# Patient Record
Sex: MALE | Race: WHITE | NOT HISPANIC OR LATINO | Employment: FULL TIME | URBAN - METROPOLITAN AREA
[De-identification: names, ages, dates, MRNs, and addresses within clinical notes are randomized per-mention and may not be internally consistent; named-entity substitution may affect disease eponyms.]

---

## 2017-08-15 ENCOUNTER — OFFICE VISIT - RIVER FALLS (OUTPATIENT)
Dept: FAMILY MEDICINE | Facility: CLINIC | Age: 22
End: 2017-08-15

## 2020-01-06 ENCOUNTER — COMMUNICATION - RIVER FALLS (OUTPATIENT)
Dept: FAMILY MEDICINE | Facility: CLINIC | Age: 25
End: 2020-01-06
Payer: COMMERCIAL

## 2020-01-10 ENCOUNTER — OFFICE VISIT (OUTPATIENT)
Dept: FAMILY MEDICINE | Facility: CLINIC | Age: 25
End: 2020-01-10
Payer: COMMERCIAL

## 2020-01-10 VITALS
BODY MASS INDEX: 22.86 KG/M2 | OXYGEN SATURATION: 97 % | TEMPERATURE: 98.6 F | HEART RATE: 77 BPM | DIASTOLIC BLOOD PRESSURE: 80 MMHG | WEIGHT: 168.8 LBS | SYSTOLIC BLOOD PRESSURE: 150 MMHG | RESPIRATION RATE: 16 BRPM | HEIGHT: 72 IN

## 2020-01-10 DIAGNOSIS — R03.0 ELEVATED BLOOD PRESSURE READING WITHOUT DIAGNOSIS OF HYPERTENSION: ICD-10-CM

## 2020-01-10 DIAGNOSIS — B35.1 ONYCHOMYCOSIS: Primary | ICD-10-CM

## 2020-01-10 DIAGNOSIS — Z13.6 CARDIOVASCULAR SCREENING; LDL GOAL LESS THAN 130: ICD-10-CM

## 2020-01-10 LAB
ALBUMIN SERPL-MCNC: 4 G/DL (ref 3.4–5)
ALP SERPL-CCNC: 123 U/L (ref 40–150)
ALT SERPL W P-5'-P-CCNC: 20 U/L (ref 0–70)
ANION GAP SERPL CALCULATED.3IONS-SCNC: 4 MMOL/L (ref 3–14)
AST SERPL W P-5'-P-CCNC: 21 U/L (ref 0–45)
BILIRUB DIRECT SERPL-MCNC: 0.1 MG/DL (ref 0–0.2)
BILIRUB SERPL-MCNC: 0.4 MG/DL (ref 0.2–1.3)
BUN SERPL-MCNC: 12 MG/DL (ref 7–30)
CALCIUM SERPL-MCNC: 9.1 MG/DL (ref 8.5–10.1)
CHLORIDE SERPL-SCNC: 104 MMOL/L (ref 94–109)
CHOLEST SERPL-MCNC: 132 MG/DL
CO2 SERPL-SCNC: 29 MMOL/L (ref 20–32)
CREAT SERPL-MCNC: 0.96 MG/DL (ref 0.66–1.25)
GFR SERPL CREATININE-BSD FRML MDRD: >90 ML/MIN/{1.73_M2}
GLUCOSE SERPL-MCNC: 99 MG/DL (ref 70–99)
HDLC SERPL-MCNC: 57 MG/DL
LDLC SERPL CALC-MCNC: 65 MG/DL
NONHDLC SERPL-MCNC: 75 MG/DL
POTASSIUM SERPL-SCNC: 4 MMOL/L (ref 3.4–5.3)
PROT SERPL-MCNC: 7.9 G/DL (ref 6.8–8.8)
SODIUM SERPL-SCNC: 137 MMOL/L (ref 133–144)
TRIGL SERPL-MCNC: 52 MG/DL
TSH SERPL DL<=0.005 MIU/L-ACNC: 1.55 MU/L (ref 0.4–4)

## 2020-01-10 PROCEDURE — 84443 ASSAY THYROID STIM HORMONE: CPT | Performed by: NURSE PRACTITIONER

## 2020-01-10 PROCEDURE — 80061 LIPID PANEL: CPT | Performed by: NURSE PRACTITIONER

## 2020-01-10 PROCEDURE — 80048 BASIC METABOLIC PNL TOTAL CA: CPT | Performed by: NURSE PRACTITIONER

## 2020-01-10 PROCEDURE — 99204 OFFICE O/P NEW MOD 45 MIN: CPT | Performed by: NURSE PRACTITIONER

## 2020-01-10 PROCEDURE — 80076 HEPATIC FUNCTION PANEL: CPT | Performed by: NURSE PRACTITIONER

## 2020-01-10 PROCEDURE — 36415 COLL VENOUS BLD VENIPUNCTURE: CPT | Performed by: NURSE PRACTITIONER

## 2020-01-10 RX ORDER — ITRACONAZOLE 100 MG/1
CAPSULE ORAL
Qty: 84 CAPSULE | Refills: 0 | Status: SHIPPED | OUTPATIENT
Start: 2020-01-10

## 2020-01-10 ASSESSMENT — MIFFLIN-ST. JEOR: SCORE: 1788.18

## 2020-01-10 NOTE — PROGRESS NOTES
Subjective     Karel Webber is a 24 year old male who presents to clinic today for the following health issues:    HPI   Chief Complaint   Patient presents with     Establish Care     Nail problem     Patient has some discoloration of toenails on his left foot. Denies any other sx     Hypertension Follow-up      Do you check your blood pressure regularly outside of the clinic? No     Are you following a low salt diet? No    Are your blood pressures ever more than 140 on the top number (systolic) OR more   than 90 on the bottom number (diastolic), for example 140/90? Yes      How many servings of fruits and vegetables do you eat daily?  0-1    On average, how many sweetened beverages do you drink each day (Examples: soda, juice, sweet tea, etc.  Do NOT count diet or artificially sweetened beverages)?   2  How many days per week do you miss taking your medication? n/a    What makes it hard for you to take your medications?  n/a         Patient Active Problem List   Diagnosis     Onychomycosis     Elevated blood pressure reading without diagnosis of hypertension     History reviewed. No pertinent surgical history.    Social History     Tobacco Use     Smoking status: Never Smoker     Smokeless tobacco: Never Used   Substance Use Topics     Alcohol use: Yes     Alcohol/week: 0.0 standard drinks     Family History   Problem Relation Age of Onset     Diabetes Maternal Grandmother      Diabetes Maternal Aunt            PROBLEMS TO ADD ON...  Rash      Duration: month    Description  Location: back  Itching: no    Intensity:  mild    Accompanying signs and symptoms: None    History (similar episodes/previous evaluation): None    Precipitating or alleviating factors:  New exposures:  None  Recent travel: no      Therapies tried and outcome: none    Reviewed and updated as needed this visit by Provider  Tobacco  Allergies  Meds  Problems  Med Hx  Surg Hx  Fam Hx         Review of Systems   ROS COMP: Constitutional,  "HEENT, cardiovascular, pulmonary, gi and gu systems are negative, except as otherwise noted.      Objective    BP (!) 150/80   Pulse 77   Temp 98.6  F (37  C) (Tympanic)   Resp 16   Ht 1.82 m (5' 11.65\")   Wt 76.6 kg (168 lb 12.8 oz)   SpO2 97%   BMI 23.12 kg/m    Body mass index is 23.12 kg/m .  Physical Exam   GENERAL: healthy, alert and no distress  EYES: Eyes grossly normal to inspection, PERRL and conjunctivae and sclerae normal  HENT: normal cephalic/atraumatic, nose and mouth without ulcers or lesions, oropharynx clear and oral mucous membranes moist  NECK: no adenopathy, no asymmetry, masses, or scars and thyroid normal to palpation  RESP: lungs clear to auscultation - no rales, rhonchi or wheezes  CV: regular rate and rhythm, normal S1 S2, no S3 or S4, no murmur, click or rub, no peripheral edema and peripheral pulses strong  ABDOMEN: soft, nontender, no hepatosplenomegaly, no masses and bowel sounds normal  MS: no gross musculoskeletal defects noted, no edema  SKIN: tan annular patches scattered to back, white/yellowing of nails to left toe nails, no thickening  NEURO: Normal strength and tone, mentation intact and speech normal  PSYCH: mentation appears normal, affect normal/bright    Diagnostic Test Results:  Labs reviewed in Epic  No results found for this or any previous visit (from the past 24 hour(s)).        Assessment & Plan       ICD-10-CM    1. Onychomycosis B35.1 itraconazole (SPORANOX) 100 MG capsule     Hepatic panel (Albumin, ALT, AST, Bili, Alk Phos, TP)   2. Elevated blood pressure reading without diagnosis of hypertension R03.0 Basic metabolic panel  (Ca, Cl, CO2, Creat, Gluc, K, Na, BUN)     TSH with free T4 reflex     Lipid panel reflex to direct LDL Non-fasting   3. CARDIOVASCULAR SCREENING; LDL GOAL LESS THAN 130 Z13.6 Lipid panel reflex to direct LDL Non-fasting          FUTURE APPOINTMENTS:       - Make appointment with nurse to check blood pressure in 8 weeks       - " Follow-up for annual visit or as needed    Work on weight loss  Regular exercise    Patient Instructions     Patient Education     Your High Blood Pressure Risk Factors  Risk factors are things that make you more likely to have a disease or condition. Do you know your risk factors for high blood pressure? You can t do anything about some risk factors. But other risk factors are things that can be changed. Know what high blood pressure risk factors you have. Then find out what changes you can make to help control your risk for high blood pressure. Start with the change that you think will be easiest for you.  Risk factors you can t control  Though you can t change any of the things listed below, check off the ones that apply to you. The more boxes you check, the greater your risk for high blood pressure.  Family history  ? One or both of your parents or grandparents has had high blood pressure or heart disease.  Gender and age  ? You re a man over age 55 or a postmenopausal woman.  Risk factors you can control  There are plenty of risk factors for high blood pressure that you can control. Learn what these risk factors are and then find out how to reduce your risk. Check the ones that apply to you.  ? What you eat  Do you eat a lot of salty, fatty, fried, or greasy foods? Do you go to restaurants or eat out frequently?  ? Alcohol consumption  Do you drink more than 1 drink a day if you are a female or more than 2 drinks a day if you male?   ? If you smoke or someone close to you smokes  Do you smoke cigarettes or cigars, chew tobacco, or dip snuff? Are you exposed to second-hand smoke on a regular basis?  ? How active you are   Are you inactive most of the time at work and at home? Do you go weeks without exercising?  ? Your weight   Has your doctor said that you are 15 or more pounds overweight?  ? Your stress level    Do you often feel anxious, nervous, and stressed? Do you feel that you don't have a supportive  environment?  Date Last Reviewed: 4/27/2016 2000-2019 [employer]. [address] [facility], [place], [place] [address]. All rights reserved. This information is not intended as a substitute for professional medical care. Always follow your healthcare professional's instructions.           Patient Education     High Blood Pressure, To Be Confirmed, No Treatment  Your blood pressure today was higher than normal. Sometimes anxiety or pain can cause a temporary rise in blood pressure. It later returns to normal. Blood pressure that is high only one time doesn t mean that you have high blood pressure (hypertension). High blood pressure is a chronic illness. But you should have your blood pressure measured again within the next few days to find out if it s still high.    Blood pressure measurements are given as 2 numbers. Systolic blood pressure is the upper number. This is the pressure when the heart contracts. Diastolic blood pressure is the lower number. This is the pressure when the heart relaxes between beats. You will see your blood pressure readings written together. For example, a person with a systolic pressure of 118 and a diastolic pressure of 78 will have 118/78 written in the medical record.  Blood pressure is categorized as normal, elevated, or stage 1 or stage 2 high blood pressure:    Normal blood pressure is systolic of less than 120 and diastolic of less than 80 (120/80)    Elevated blood pressure is systolic of 120 to 129 and diastolic less than 80    Stage 1 high blood pressure is systolic is 130 to 139 or diastolic between 80 to 89    Stage 2 high blood pressure is when systolic is 140 or higher or the diastolic is 90 or higher  Lifestyle changes such as weight loss, exercise, and quitting smoking, can help manage your blood pressure. Have your blood pressure checked regularly to be sure it is under control.  Home care  To track your blood pressure, your provider may ask you to come into the  office at different times and on different days. If your healthcare provider asks you to check your readings at home, ask him or her what times of the day to test and for how many days. Before you leave the office, ask your provider to show you how to take your blood pressure and be sure to ask questions if you don't understand something.  Consider buying an automatic blood pressure monitor. Ask your provider for a recommendation as well as the proper size cuff to fit your arm. You can buy blood pressure monitors at most pharmacies.  The American Heart Association recommends the following guidelines for home blood pressure monitoring:    Don't smoke or drink coffee or other caffeinated drinks for 30 minutes before taking your blood pressure.    Go to the bathroom before the test.    Relax for 5 minutes before taking the measurement.    Sit with your back supported (don't sit on a couch or soft chair); keep your feet on the floor uncrossed. Place your arm on a solid flat surface (like a table) with the upper part of the arm at heart level. Place the middle of the cuff directly above the bend of the elbow. Check the monitor's instruction manual for an illustration.    Take multiple readings. When you measure, take 2 to 3 readings one minute apart and record all of the results.    Take your blood pressure at the same time every day, or as your healthcare provider recommends.    Record the date, time, and blood pressure reading.    Take the record with you to your next medical appointment. If your blood pressure monitor has a built-in memory, simply take the monitor with you to your next appointment.    Call your provider if you have several high readings. Don't be frightened by a single high blood pressure reading, but if you get several high readings, check in with your healthcare provider.    Note: When blood pressure reaches a systolic (top number) of 180 or higher OR diastolic (bottom number) of 110 or higher, seek  emergency medical treatment.  Follow-up care  Keep all of your follow up appointments. If your blood pressure is more than 120 over 80 on 2 out of 3 days, you will need to follow up with your healthcare provider for more evaluation and treatment.  Don t put this off! High blood pressure can be treated. High blood pressure that s not treated raises your risk for heart attack, heart failure, and stroke.  When to seek medical advice  Call your healthcare provider right away if any of these occur:    Blood pressure reaches a systolic (top number) of 180 or higher, OR diastolic (bottom number) of 110 or higher    Chest pain or shortness of breath    Severe headache    Throbbing or rushing sound in the ears    Nosebleed    Sudden severe pain in your belly (abdomen)    Extreme drowsiness, confusion, or fainting    Dizziness or dizziness with spinning sensation (vertigo)    Weakness of an arm or leg or one side of the face    You have problems speaking or seeing   Date Last Reviewed: 12/1/2016 2000-2019 The "[employer]. [address] [place] [address]. All rights reserved. This information is not intended as a substitute for professional medical care. Always follow your healthcare professional's instructions.           Patient Education     Nail Fungal Infection  A nail fungal infection changes the way fingernails and toenails look. They may thicken, discolor, change shape, or split. This condition is hard to treat because nails grow slowly and have limited blood supply. The infection often comes back after treatment.  There are 2 types of medicines used to treat this condition:    Topical anti-fungal medicines. These are applied to the surface of the skin and nail area. These medicines are not very effective because they can t get deep into the nail.    Oral antifungal medicines. These medicines work better because they go into the nail from the inside out. But the infection may still come back. It  may take 9 to 12 months for your nail to look normal again. This means you are cured. You can repeat treatment if needed. Most people take these medicines without any problems. It is rare to stop therapy because of side effects. But your healthcare provider may give you some monitoring tests. Talk about possible side effects with your provider before starting treatment.  If medicines fail, the nail can be removed surgically or chemically. These methods physically remove the fungus from the body. This helps medical treatment be more effective.  Home care    Use medicines exactly as directed for as long as directed. Treating a fungal infection can take longer than other kinds of infections.    Smoking is a risk factor for fungal infection. This is one more reason to quit.    Wear absorbent socks, and shoes that let your feet breathe. Sweaty feet increase your risk of fungal infection. They also make an existing infection harder to treat.    Use footwear when in damp public places like swimming pools, gyms, and shower rooms. This will help you avoid the fungus that grows there.    Don't share nail clippers or scissors with others.  Follow-up care  Follow up with your healthcare provider, or as advised.  When to seek medical advice  Call your healthcare provider right away if any of these occur:    Skin by the nail becomes red, swollen, painful, or drains pus (a creamy yellow or white liquid)    Side effects from oral anti-fungal medicines  Date Last Reviewed: 8/1/2016 2000-2019 [employer]. [address] [place], [place] [id number]. All rights reserved. This information is not intended as a substitute for professional medical care. Always follow your healthcare professional's instructions.               No follow-ups on file.    PHILIPPE Mehta [facility]

## 2020-01-10 NOTE — LETTER
[facility]  [address]  [facility] [id number]  Phone: [phone]      1/13/2020     Karel Webber  [address] [facility] [address]      Dear Karel:    Thank you for allowing me to participate in your care. Your recent test results were reviewed and listed below. Cholesterol levels are great. Thyroid function is normal. Kidney and liver function is normal, as well as the blood sugar and electrolytes. Follow up with nurse to check blood pressure in a couple months. Please let us know if you have any questions.      Your results are provided below for your review  Results for orders placed or performed in visit on 01/10/20   Basic metabolic panel  (Ca, Cl, CO2, Creat, Gluc, K, Na, BUN)     Status: None   Result Value Ref Range    Sodium 137 133 - 144 mmol/L    Potassium 4.0 3.4 - 5.3 mmol/L    Chloride 104 94 - 109 mmol/L    Carbon Dioxide 29 20 - 32 mmol/L    Anion Gap 4 3 - 14 mmol/L    Glucose 99 70 - 99 mg/dL    Urea Nitrogen 12 7 - 30 mg/dL    Creatinine 0.96 0.66 - 1.25 mg/dL    GFR Estimate >90 >60 mL/min/[1.73_m2]    GFR Estimate If Black >90 >60 mL/min/[1.73_m2]    Calcium 9.1 8.5 - 10.1 mg/dL   TSH with free T4 reflex     Status: None   Result Value Ref Range    TSH 1.55 0.40 - 4.00 mU/L   Lipid panel reflex to direct LDL Non-fasting     Status: None   Result Value Ref Range    Cholesterol 132 <200 mg/dL    Triglycerides 52 <150 mg/dL    HDL Cholesterol 57 >39 mg/dL    LDL Cholesterol Calculated 65 <100 mg/dL    Non HDL Cholesterol 75 <130 mg/dL   Hepatic panel (Albumin, ALT, AST, Bili, Alk Phos, TP)     Status: None   Result Value Ref Range    Bilirubin Direct 0.1 0.0 - 0.2 mg/dL    Bilirubin Total 0.4 0.2 - 1.3 mg/dL    Albumin 4.0 3.4 - 5.0 g/dL    Protein Total 7.9 6.8 - 8.8 g/dL    Alkaline Phosphatase 123 40 - 150 U/L    ALT 20 0 - 70 U/L    AST 21 0 - 45 U/L     Thank you for choosing [facility]. As a result, please continue with the treatment plan discussed in  the office. Return as discussed or sooner if symptoms worsen or fail to improve.     If you have any further questions or concerns, please do not hesitate to contact us.    Sincerely,        PHILIPPE Wilson CNP

## 2020-01-10 NOTE — PATIENT INSTRUCTIONS
Patient Education     Your High Blood Pressure Risk Factors  Risk factors are things that make you more likely to have a disease or condition. Do you know your risk factors for high blood pressure? You can t do anything about some risk factors. But other risk factors are things that can be changed. Know what high blood pressure risk factors you have. Then find out what changes you can make to help control your risk for high blood pressure. Start with the change that you think will be easiest for you.  Risk factors you can t control  Though you can t change any of the things listed below, check off the ones that apply to you. The more boxes you check, the greater your risk for high blood pressure.  Family history  ? One or both of your parents or grandparents has had high blood pressure or heart disease.  Gender and age  ? You re a man over age 55 or a postmenopausal woman.  Risk factors you can control  There are plenty of risk factors for high blood pressure that you can control. Learn what these risk factors are and then find out how to reduce your risk. Check the ones that apply to you.  ? What you eat  Do you eat a lot of salty, fatty, fried, or greasy foods? Do you go to restaurants or eat out frequently?  ? Alcohol consumption  Do you drink more than 1 drink a day if you are a female or more than 2 drinks a day if you male?   ? If you smoke or someone close to you smokes  Do you smoke cigarettes or cigars, chew tobacco, or dip snuff? Are you exposed to second-hand smoke on a regular basis?  ? How active you are   Are you inactive most of the time at work and at home? Do you go weeks without exercising?  ? Your weight   Has your doctor said that you are 15 or more pounds overweight?  ? Your stress level    Do you often feel anxious, nervous, and stressed? Do you feel that you don't have a supportive environment?  Date Last Reviewed: 4/27/2016 2000-2019 [employer]. [address] [facility],  [place] [address]. All rights reserved. This information is not intended as a substitute for professional medical care. Always follow your healthcare professional's instructions.           Patient Education     High Blood Pressure, To Be Confirmed, No Treatment  Your blood pressure today was higher than normal. Sometimes anxiety or pain can cause a temporary rise in blood pressure. It later returns to normal. Blood pressure that is high only one time doesn t mean that you have high blood pressure (hypertension). High blood pressure is a chronic illness. But you should have your blood pressure measured again within the next few days to find out if it s still high.    Blood pressure measurements are given as 2 numbers. Systolic blood pressure is the upper number. This is the pressure when the heart contracts. Diastolic blood pressure is the lower number. This is the pressure when the heart relaxes between beats. You will see your blood pressure readings written together. For example, a person with a systolic pressure of 118 and a diastolic pressure of 78 will have 118/78 written in the medical record.  Blood pressure is categorized as normal, elevated, or stage 1 or stage 2 high blood pressure:    Normal blood pressure is systolic of less than 120 and diastolic of less than 80 (120/80)    Elevated blood pressure is systolic of 120 to 129 and diastolic less than 80    Stage 1 high blood pressure is systolic is 130 to 139 or diastolic between 80 to 89    Stage 2 high blood pressure is when systolic is 140 or higher or the diastolic is 90 or higher  Lifestyle changes such as weight loss, exercise, and quitting smoking, can help manage your blood pressure. Have your blood pressure checked regularly to be sure it is under control.  Home care  To track your blood pressure, your provider may ask you to come into the office at different times and on different days. If your healthcare provider asks you to check your readings  at home, ask him or her what times of the day to test and for how many days. Before you leave the office, ask your provider to show you how to take your blood pressure and be sure to ask questions if you don't understand something.  Consider buying an automatic blood pressure monitor. Ask your provider for a recommendation as well as the proper size cuff to fit your arm. You can buy blood pressure monitors at most pharmacies.  The American Heart Association recommends the following guidelines for home blood pressure monitoring:    Don't smoke or drink coffee or other caffeinated drinks for 30 minutes before taking your blood pressure.    Go to the bathroom before the test.    Relax for 5 minutes before taking the measurement.    Sit with your back supported (don't sit on a couch or soft chair); keep your feet on the floor uncrossed. Place your arm on a solid flat surface (like a table) with the upper part of the arm at heart level. Place the middle of the cuff directly above the bend of the elbow. Check the monitor's instruction manual for an illustration.    Take multiple readings. When you measure, take 2 to 3 readings one minute apart and record all of the results.    Take your blood pressure at the same time every day, or as your healthcare provider recommends.    Record the date, time, and blood pressure reading.    Take the record with you to your next medical appointment. If your blood pressure monitor has a built-in memory, simply take the monitor with you to your next appointment.    Call your provider if you have several high readings. Don't be frightened by a single high blood pressure reading, but if you get several high readings, check in with your healthcare provider.    Note: When blood pressure reaches a systolic (top number) of 180 or higher OR diastolic (bottom number) of 110 or higher, seek emergency medical treatment.  Follow-up care  Keep all of your follow up appointments. If your blood  pressure is more than 120 over 80 on 2 out of 3 days, you will need to follow up with your healthcare provider for more evaluation and treatment.  Don t put this off! High blood pressure can be treated. High blood pressure that s not treated raises your risk for heart attack, heart failure, and stroke.  When to seek medical advice  Call your healthcare provider right away if any of these occur:    Blood pressure reaches a systolic (top number) of 180 or higher, OR diastolic (bottom number) of 110 or higher    Chest pain or shortness of breath    Severe headache    Throbbing or rushing sound in the ears    Nosebleed    Sudden severe pain in your belly (abdomen)    Extreme drowsiness, confusion, or fainting    Dizziness or dizziness with spinning sensation (vertigo)    Weakness of an arm or leg or one side of the face    You have problems speaking or seeing   Date Last Reviewed: 12/1/2016 2000-2019 [employer]. [address] [place] [address]. All rights reserved. This information is not intended as a substitute for professional medical care. Always follow your healthcare professional's instructions.           Patient Education     Nail Fungal Infection  A nail fungal infection changes the way fingernails and toenails look. They may thicken, discolor, change shape, or split. This condition is hard to treat because nails grow slowly and have limited blood supply. The infection often comes back after treatment.  There are 2 types of medicines used to treat this condition:    Topical anti-fungal medicines. These are applied to the surface of the skin and nail area. These medicines are not very effective because they can t get deep into the nail.    Oral antifungal medicines. These medicines work better because they go into the nail from the inside out. But the infection may still come back. It may take 9 to 12 months for your nail to look normal again. This means you are cured. You can repeat  treatment if needed. Most people take these medicines without any problems. It is rare to stop therapy because of side effects. But your healthcare provider may give you some monitoring tests. Talk about possible side effects with your provider before starting treatment.  If medicines fail, the nail can be removed surgically or chemically. These methods physically remove the fungus from the body. This helps medical treatment be more effective.  Home care    Use medicines exactly as directed for as long as directed. Treating a fungal infection can take longer than other kinds of infections.    Smoking is a risk factor for fungal infection. This is one more reason to quit.    Wear absorbent socks, and shoes that let your feet breathe. Sweaty feet increase your risk of fungal infection. They also make an existing infection harder to treat.    Use footwear when in damp public places like swimming pools, gyms, and shower rooms. This will help you avoid the fungus that grows there.    Don't share nail clippers or scissors with others.  Follow-up care  Follow up with your healthcare provider, or as advised.  When to seek medical advice  Call your healthcare provider right away if any of these occur:    Skin by the nail becomes red, swollen, painful, or drains pus (a creamy yellow or white liquid)    Side effects from oral anti-fungal medicines  Date Last Reviewed: 8/1/2016 2000-2019 [employer]. [address] [place] [address], [place] [address]. All rights reserved. This information is not intended as a substitute for professional medical care. Always follow your healthcare professional's instructions.

## 2020-01-13 NOTE — RESULT ENCOUNTER NOTE
Please send patient letter notifying of labs and provider message.    Cholesterol levels are great. Thyroid function is normal. Kidney and liver function is normal, as well as the blood sugar and electrolytes. Follow up with nurse to check blood pressure in a couple months. Please let us know if you have any questions.      Thanks,  PHILIPPE Wilson CNP

## 2020-11-24 DIAGNOSIS — Z31.41 FERTILITY TESTING: Primary | ICD-10-CM

## 2021-02-02 DIAGNOSIS — Z31.41 FERTILITY TESTING: ICD-10-CM

## 2021-02-02 LAB
ABNORMAL SPERM: 98 MORPHOLOGY
ABSTINENCE DAYS: 3 DAYS (ref 2–7)
AGGLUTINATION: NO YES/NO
ANALYSIS TEMP - CENTIGRADE: 23 CENTIGRADE
CELL FRAGMENTS: ABNORMAL %
COLLECTION METHOD: ABNORMAL
COLLECTION SITE: ABNORMAL
CONSENT TO RELEASE TO PARTNER: YES
HEAD DEFECT: 98
IMMATURE SPERM: ABNORMAL %
IMMOTILE: 48 %
LAB RECEIPT TIME: ABNORMAL
LIQUEFIED: YES YES/NO
MIDPIECE DEFECT: 41
NON-PROGRESSIVE MOTILITY: 11 %
NORMAL SPERM: 2 % NORMAL FORMS (ref 4–?)
PROGRESSIVE MOTILITY: 41 % (ref 32–?)
ROUND CELLS: 0.2 MILLION/ML (ref ?–2)
SPECIMEN CONCENTRATION: 9 MILLION/ML (ref 15–?)
SPECIMEN PH: 7.6 PH (ref 7.2–?)
SPECIMEN TYPE: ABNORMAL
SPECIMEN VOL UR: 3.7 ML (ref 1.5–?)
TAIL DEFECT: 3
TIME OF ANALYSIS: ABNORMAL
TOTAL NUMBER: 33 MILLION (ref 39–?)
TOTAL PROGRESSIVE MOTILE: 14 MILLION (ref 15.6–?)
VISCOUS: NO YES/NO
VITALITY: ABNORMAL % (ref 58–?)
WBC SPECIMEN: ABNORMAL %

## 2021-02-02 PROCEDURE — 89322 SEMEN ANAL STRICT CRITERIA: CPT

## 2021-02-05 NOTE — RESULT ENCOUNTER NOTE
Please inform Karel of his results.  You can put them into a letter addressed to him  Romeo Arriaza MD

## 2021-02-08 ENCOUNTER — TELEPHONE (OUTPATIENT)
Dept: OBGYN | Facility: CLINIC | Age: 26
End: 2021-02-08

## 2021-02-08 DIAGNOSIS — Z31.41 FERTILITY TESTING: Primary | ICD-10-CM

## 2021-02-08 NOTE — LETTER
February 8, 2021      Karel Webber                                                                [address] [facility] [address]          Dear Karel,      The results of your recent semen analysis were not entirely normal numbers, but the situation is not hopeless. Keep trying to conceive. You can try wearing Boxers and doing a recheck semen analysis test. Also you and your partner can consider discussing doing IUI (Intrauterine Insemination) Dr. Shane and Dr. Adam are the two physicians that do this in our clinic a virtual visit appointment can be made to discuss these options further with either of them.    Component      Latest Ref Rng & Units 2/2/2021   Collection Method       Masturbation   Collection Site       ORI   Specimen Type       Semen   Lab Receipt Time       11:25 AM   Time of Analysis       11:40 AM   Analysis Temp - Centigrade      centigrade 23   Abstinence days      2 - 7 days 3   Liquefied      yes/no Yes   Viscous      yes/no No   Agglutination      yes/no No   pH      7.2 pH 7.6   Volume      1.5 ml 3.7   Concentration      15 million/ml 9.0 (A)   Total Number      39 million 33 (A)   Progressive motility      32 % 41   Non-progressive motility      % 11   Immotile      % 48   Total Progressive Motile      15.6 million 14 (A)   Vitality      58 % ND   Normal Sperm      4 % normal forms 2 (A)   Abnormal Sperm      morphology 98   Head Defect       98   Midpiece Defect       41   Tail Defect       3   Round Cells      2 million/ml 0.2   WBC      % .   Immature Sperm      % .   Cell Fragments      % .   Consent to Release to Partner       Yes         Sincerely,       Romeo Arriaza MD

## 2021-02-08 NOTE — TELEPHONE ENCOUNTER
Called and informed patient of the results. Placed future order for patient to schedule a follow up Semen Analysis if he chooses.     Radha Robertson, CMA

## 2021-02-08 NOTE — TELEPHONE ENCOUNTER
----- Message from Romeo Arriaza MD sent at 2/8/2021  1:58 PM CST -----  He can try wearing Boxers and doing a recheck.   Keep trying to conceive.  He can consider IUI. (I don't do that)  These are not entirely normal numbers , but the situation is not hopeless.  Romeo Arriaza MD  ----- Message -----  From: Radha Robertson  Sent: 2/8/2021   1:54 PM CST  To: Romeo Arriaza MD    Are results normal or abnormal? What are the next steps he should take?    Radha Robertson [facility]

## 2021-03-06 ENCOUNTER — HEALTH MAINTENANCE LETTER (OUTPATIENT)
Age: 26
End: 2021-03-06

## 2021-10-09 ENCOUNTER — HEALTH MAINTENANCE LETTER (OUTPATIENT)
Age: 26
End: 2021-10-09

## 2022-02-12 VITALS
HEART RATE: 63 BPM | SYSTOLIC BLOOD PRESSURE: 146 MMHG | WEIGHT: 156.4 LBS | DIASTOLIC BLOOD PRESSURE: 74 MMHG | TEMPERATURE: 98.1 F

## 2022-02-16 NOTE — TELEPHONE ENCOUNTER
---------------------  From: Soniya Dailey RN (Phone Messages Pool ([id number]_[facility]))   To: [facility] Message Pool ([id number]);     Sent: 1/6/2020 10:29:49 AM CST  Subject: Phone Message - refill request     Phone Message    PCP:   None - prescribed by ROCHELLE     Time of Call:  1006       Person Calling:  Pt  Phone number:  [phone]    Returned call at: 1027    Note:   Pt called requesting refill of Betamethasone Topical that he had been prescribed in 2016 for his Eczema. Returned call and informed him he has not been seen in clinic since 2017. Informed him provider may want to see him before prescribing again. He expressed understanding but wanted to see if this could be filled without appointment. Pharmacy is [facility] in [place], [place].    Please advise on refill.     Last office visit and reason:  8- Nail problem w/TFSDave?  your last visit with this pt was 2016.  Get Mccullough CMA---------------------  From: Get Mccullough CMA ([employer] Message Pool ([id number]))   To: Jonathan Azevedo PA-C;     Sent: 1/6/2020 11:24:51 AM CST  Subject: FW: Phone Message - refill request---------------------  From: Jonathan Azevedo PA-C   To: [employer] Message [employer] ([id number]_[facility]);     Sent: 1/6/2020 11:25:45 AM CST  Subject: RE: Phone Message - refill request     he needs an apptI called pt and gave him DWG message.  Get Mccullough CMA

## 2022-02-16 NOTE — PROGRESS NOTES
Patient:   DANGELO GILLIS            MRN: [id number]            FIN: [id number]               Age:   21 years     Sex:  Male     :  1995   Associated Diagnoses:   Onychomycosis   Author:   Surinder Bowling MD      Chief Complaint   8/15/2017 6:45 PM CDT    Finger nail deformity, possible fungus.        History of Present Illness   chief complaint and symptoms as noted above confirmed with patient   r thumb for  a year now several other nails r hand      Review of Systems   Constitutional:  Negative except as documented in history of present illness.    Musculoskeletal:  Negative.    Integumentary:  Negative except as documented in history of present illness.       Health Status   Allergies:    Allergic Reactions (Selected)  No Known Medication Allergies   Medications:  (Selected)   Prescriptions  Prescribed  LamISIL 250 mg oral tablet: 1 tab(s) ( 250 mg ), po, daily, # 42 tab(s), 0 Refill(s), Type: Maintenance, Pharmacy: [id number], 1 tab(s) po daily,x42 day(s)  betamethasone dipropionate 0.05% topical cream: 1 alfonzo, TOP, BID, # 50 g, 1 Refill(s), Type: Maintenance, Pharmacy: [facility] #[id number], 1 alfonzo top bid      Histories   Past Medical History:    Resolved  History of chicken pox ([id number]):  Resolved.   Family History:    Diabetes mellitus  Grandmother (M)     Procedure history:    None (SNOMED CT [id number]).   Social History:        Tobacco Assessment            Household tobacco concerns: No.      Employment and Education Assessment            Student, Work/School description: Full time student.        Physical Examination   Vital Signs   8/15/2017 6:45 PM CDT Temperature Tympanic 98.1 DegF    Peripheral Pulse Rate 63 bpm    Systolic Blood Pressure 146 mmHg  HI    Diastolic Blood Pressure 74 mmHg    Mean Arterial Pressure 98 mmHg      Measurements from flowsheet : Measurements   8/15/2017 6:45 PM CDT    Weight Measured - Standard                156.4 lb      General:  Alert and oriented, No acute distress.    Integumentary:  several nails right hand thickened yellowish   eczema hands.    Neurologic:  Alert, Oriented.       Impression and Plan   Diagnosis     Onychomycosis (VHH37-IE B35.1).     Course:  Not progressing as expected.    Plan:  lamisil 6 weeks  consider derm referral.    Patient Instructions:       Counseled: Patient, Regarding diagnosis, Regarding medications.

## 2022-03-20 ENCOUNTER — HEALTH MAINTENANCE LETTER (OUTPATIENT)
Age: 27
End: 2022-03-20

## 2022-09-11 ENCOUNTER — HEALTH MAINTENANCE LETTER (OUTPATIENT)
Age: 27
End: 2022-09-11

## 2023-04-13 ENCOUNTER — OFFICE VISIT (OUTPATIENT)
Dept: URGENT CARE | Facility: URGENT CARE | Age: 28
End: 2023-04-13
Payer: COMMERCIAL

## 2023-04-13 VITALS
SYSTOLIC BLOOD PRESSURE: 161 MMHG | WEIGHT: 176 LBS | TEMPERATURE: 98.4 F | DIASTOLIC BLOOD PRESSURE: 74 MMHG | HEART RATE: 90 BPM | OXYGEN SATURATION: 99 % | BODY MASS INDEX: 24.1 KG/M2

## 2023-04-13 DIAGNOSIS — L73.9 FOLLICULITIS: Primary | ICD-10-CM

## 2023-04-13 PROCEDURE — 99213 OFFICE O/P EST LOW 20 MIN: CPT | Mod: 25

## 2023-04-13 PROCEDURE — 10060 I&D ABSCESS SIMPLE/SINGLE: CPT

## 2023-04-13 RX ORDER — MULTIPLE VITAMINS W/ MINERALS TAB 9MG-400MCG
1 TAB ORAL DAILY
COMMUNITY

## 2023-04-13 RX ORDER — MUPIROCIN 20 MG/G
OINTMENT TOPICAL 3 TIMES DAILY
Qty: 15 G | Refills: 0 | Status: SHIPPED | OUTPATIENT
Start: 2023-04-13

## 2023-04-14 NOTE — PATIENT INSTRUCTIONS
Diagnosis: incision and drainage of folliculitis of the RLE   Today we did:  Cleaned and drained     Plan:   Antibiotic ointment  apply daily   Tylenol and ibuprofen for pain and swelling   Keep wound clean, dry and intact   OTC Antibiotic ointment as needed to site   Monitor for:   Monitor for signs of infection:   redness, swelling, purulent drainage, warmth at the site, pus   Fevers   Increased pain, worsening pain   Changes in sensation to the site  Inability to move the extremity where injury occurred   Wound area becoming hot or warm to touch.  Pus or fluid leaking out of the wound.  Red streaks that run towards the heart from the wound.    WOUND CARE:  Wash hands with soapy warm water BEFORE and AFTER performing wound care.  If bleeding starts - apply pressure to the wound to stop the bleeding.  Clean the wound.   Prevent infection with a topical antibiotic.  Cover the wound with a bandage.  Monitor the wound for signs/symptoms of infection, as listed above.

## 2023-04-14 NOTE — PROGRESS NOTES
URGENT CARE  Assessment & Plan   Assessment:   Karel Webber is a 27 year old male who's clinical presentation today is consistent with:   1. Folliculitis  - mupirocin (BACTROBAN) 2 % external ointment; Apply topically 3 times daily    - DRAIN SKIN ABSCESS SIMPLE/SINGLE  No alarm signs or symptoms present    Plan:  Incision and drainage procedure done today, see procedure note, willl treat patient with topical antibiotic ointment for prophylaxis skin coverage, Encourage patient to continue to monitor symptoms of increased infection/ spreading. Discussed wound care  Additionally we discussed if symptoms do not improve after starting today's treatment (or if symptoms worsen) to follow up in 3-5 days.     Patient  is  agreeable to treatment plan and state they will follow-up if symptoms do not improve and/or if symptoms worsen (see patient's AVS 'monitor for' section for specific patient instructions given and discussed regarding what to watch for and when to follow up)    Medications ordered are listed above, please see AVS for patient's specific and personalized discharge instructions given     PHILIPPE Arreola [facility] [place]      ______________________________________________________________________        Subjective  Subjective     HPI: Karel Webber  is a 27 year old  male who presents today for evaluation the following concerns:   Patient presents with a red, swollen, tender nodule on their LLE/ shin, which started (was first noticed ) 2 days ago, on 4/11/23.   Patient endorses he doesn't remember doing anything that caused this, nor does he have a cut or open area to the leg , patient states hey have not  had a history of abscesses before, denies any MRSA infection history   Patient denies fever, chill, regional lymphadenopathy.      No Known Allergies  Patient Active Problem List   Diagnosis     Onychomycosis     Elevated blood pressure reading without diagnosis of  hypertension       Review of Systems:  Pertinent review of systems as reflected in HPI, otherwise negative.     Objective  Objective    Physical Exam:  Vitals:    04/13/23 1926   BP: (!) 161/74   Pulse: 90   Temp: 98.4  F (36.9  C)   TempSrc: Tympanic   SpO2: 99%   Weight: 79.8 kg (176 lb)      General:   alert and oriented, no acute distress, non- ill-appearing   Vital signs reviewed: afebrile  Psy/mental status: pleasant   SKIN: Skin: An erythematous, indurated hair containing pustule  noted on the anterior aspect of    The LLE, without  surrounding cellulitis noted   Nodule contains purulent material  Nodule is painful/tender to palpation. Fluctuance noted, central pus,    No fevers, chills or regional adenopathy observed    Incision and drainage Procedure:   -Risks and benefits of bleeding, infection, blood vessel damage, tendon damage, nerve damage, damage to local structures, missed foreign bodies, and scar(s) were discussed with patient.   -After informed verbal consent obtained, skin was sterilely prepped with chlorhexidine and draped in usual fashion .  -Analgesia was obtained using LET gel applied topically   -Using an 11 blade a 6mm  fusiform ellipse incision was made.   -Purulent material was noted to drain from site  Neurovascularity and soft tissue structures intact.  The laceration was left open.  Covered with bacitracin, nonadherent dressing   Patient tolerated procedure well.  Educated on wound care         I explained my diagnostic considerations and recommendations to the patient, who voiced understanding and agreement with the treatment plan.   All questions were answered.   We discussed potential side effects, risks and benefits of any prescribed or recommended therapies, as well as expectations for response to treatments.  Please see AVS for any patient instructions & handouts given.   Patient was advised to contact the Nurse Care Line, their Primary Care provider, Urgent Care, or the Emergency  Department if there are new or worsening symptoms, or call 911 for emergencies.        ______________________________________________________________________          Patient Instructions   Diagnosis: incision and drainage of folliculitis of the RLE   Today we did:  Cleaned and drained     Plan:   1. Antibiotic ointment  apply daily   2. Tylenol and ibuprofen for pain and swelling   3. Keep wound clean, dry and intact   1. OTC Antibiotic ointment as needed to site   Monitor for:     Monitor for signs of infection:     redness, swelling, purulent drainage, warmth at the site, pus     Fevers     Increased pain, worsening pain     Changes in sensation to the site    Inability to move the extremity where injury occurred     Wound area becoming hot or warm to touch.    Pus or fluid leaking out of the wound.    Red streaks that run towards the heart from the wound.    WOUND CARE:    Wash hands with soapy warm water BEFORE and AFTER performing wound care.    If bleeding starts - apply pressure to the wound to stop the bleeding.    Clean the wound.     Prevent infection with a topical antibiotic.    Cover the wound with a bandage.    Monitor the wound for signs/symptoms of infection, as listed above.

## 2023-05-06 ENCOUNTER — HEALTH MAINTENANCE LETTER (OUTPATIENT)
Age: 28
End: 2023-05-06

## 2023-10-19 ENCOUNTER — OFFICE VISIT (OUTPATIENT)
Dept: URGENT CARE | Facility: URGENT CARE | Age: 28
End: 2023-10-19
Payer: COMMERCIAL

## 2023-10-19 VITALS
BODY MASS INDEX: 23.55 KG/M2 | TEMPERATURE: 97.7 F | RESPIRATION RATE: 16 BRPM | OXYGEN SATURATION: 97 % | SYSTOLIC BLOOD PRESSURE: 141 MMHG | DIASTOLIC BLOOD PRESSURE: 81 MMHG | HEART RATE: 73 BPM | WEIGHT: 172 LBS

## 2023-10-19 DIAGNOSIS — H61.22 IMPACTED CERUMEN OF LEFT EAR: Primary | ICD-10-CM

## 2023-10-19 PROCEDURE — 69209 REMOVE IMPACTED EAR WAX UNI: CPT | Mod: LT | Performed by: FAMILY MEDICINE

## 2023-10-19 NOTE — PROGRESS NOTES
SUBJECTIVE: Here for suspected ear wax impaction. Can't hear well on the right but the left feels plugged a couple of times. No pain. No uri symptoms.     OBJECTIVE:   HEENT exam reveals bilateral cerumenosis.     ASSESSMENT: Cerumenosis    PLAN: Ear wax removed by irritgation with no complications on the right the left led to some bloody drainage that appears to be coming from the canal wall. There was still large amount of wax and hearing decreased on that side. (Possibly more noticeable since the right is clear per his report. Recommended debrox to soften and for time for wall to heal then reirrigate this coming week. follow up discussed.

## 2023-10-19 NOTE — PATIENT INSTRUCTIONS
Use debrox 4-6 drops left ear twice daily for 3-5 days.     Make nurse only appt for ear wax removal.    Applied

## 2023-10-20 ENCOUNTER — HOSPITAL ENCOUNTER (EMERGENCY)
Facility: CLINIC | Age: 28
Discharge: HOME | End: 2023-10-20
Attending: FAMILY MEDICINE | Admitting: FAMILY MEDICINE
Payer: COMMERCIAL

## 2023-10-20 VITALS
HEART RATE: 88 BPM | BODY MASS INDEX: 23.03 KG/M2 | RESPIRATION RATE: 18 BRPM | DIASTOLIC BLOOD PRESSURE: 95 MMHG | TEMPERATURE: 97.5 F | HEIGHT: 72 IN | WEIGHT: 170 LBS | SYSTOLIC BLOOD PRESSURE: 150 MMHG | OXYGEN SATURATION: 100 %

## 2023-10-20 DIAGNOSIS — H92.02 ACUTE PAIN OF LEFT EAR: ICD-10-CM

## 2023-10-20 DIAGNOSIS — H61.23 BILATERAL IMPACTED CERUMEN: ICD-10-CM

## 2023-10-20 PROCEDURE — 99284 EMERGENCY DEPT VISIT MOD MDM: CPT | Performed by: NURSE PRACTITIONER

## 2023-10-20 PROCEDURE — 99284 EMERGENCY DEPT VISIT MOD MDM: CPT

## 2023-10-20 RX ORDER — CIPROFLOXACIN AND DEXAMETHASONE 3; 1 MG/ML; MG/ML
5 SUSPENSION/ DROPS AURICULAR (OTIC) 3 TIMES DAILY
Qty: 7.5 ML | Refills: 0 | Status: SHIPPED | OUTPATIENT
Start: 2023-10-20 | End: 2023-10-20

## 2023-10-20 RX ORDER — OFLOXACIN 3 MG/ML
3 SOLUTION/ DROPS OPHTHALMIC 3 TIMES DAILY
Qty: 10 ML | Refills: 0 | Status: SHIPPED | OUTPATIENT
Start: 2023-10-20

## 2023-10-20 RX ORDER — PREDNISOLONE ACETATE 10 MG/ML
3 SUSPENSION/ DROPS OPHTHALMIC 3 TIMES DAILY
Qty: 10 ML | Refills: 0 | Status: SHIPPED | OUTPATIENT
Start: 2023-10-20

## 2023-10-20 ASSESSMENT — ACTIVITIES OF DAILY LIVING (ADL): ADLS_ACUITY_SCORE: 33

## 2023-10-20 NOTE — ED TRIAGE NOTES
"Pt here with left ear pain, Pt was seen in [facility] yesterday, had ears irrigated \"and they really irritated the left one and its more plugged and I can't hear\".      Triage Assessment (Adult)       Row Name 10/20/23 0942          Triage Assessment    Airway WDL WDL        Respiratory WDL    Respiratory WDL WDL        Skin Circulation/Temperature WDL    Skin Circulation/Temperature WDL WDL        Cardiac WDL    Cardiac WDL WDL        Peripheral/Neurovascular WDL    Peripheral Neurovascular WDL WDL        Cognitive/Neuro/Behavioral WDL    Cognitive/Neuro/Behavioral WDL WDL                     "

## 2023-10-20 NOTE — ED PROVIDER NOTES
"  History     Chief Complaint   Patient presents with    Otalgia     HPI  Karel Webber is a 27 year old male who presents to the ED with left ear pain.  Patient reports his right ear is not causing any problems.  He reports his left ear is moderately painful.  He reports it is an aching pressure-like pain and rates this pain 7 out of 10.  Symptoms noted to start after presenting for ear irrigation yesterday at urgent care.  The reason for presenting to urgent care was hearing loss due to concern for cerumen impaction.    He reports onset of symptoms were noted at the time of ear irrigation and markedly worsening over the evening last evening and into today.  He has treated his symptoms with ibuprofen last evening.  He has not taken any medications today.  He reports otherwise feeling well.  He denies dizziness, facial swelling, redness, fevers, chills, sweats, vision changes.  He does endorse hearing loss.    Triage note \"Pt here with left ear pain, Pt was seen in [facility] yesterday, had ears irrigated \"and they really irritated the left one and its more plugged and I can't hear\".      Allergies:  No Known Allergies    Problem List:    Patient Active Problem List    Diagnosis Date Noted    Onychomycosis 01/10/2020     Priority: Medium    Elevated blood pressure reading without diagnosis of hypertension 01/10/2020     Priority: Medium        Past Medical History:    No past medical history on file.    Past Surgical History:    No past surgical history on file.    Family History:    Family History   Problem Relation Age of Onset    Diabetes Maternal Grandmother     Diabetes Maternal Aunt        Social History:  Marital Status:   [2]  Social History     Tobacco Use    Smoking status: Never    Smokeless tobacco: Never   Substance Use Topics    Alcohol use: Yes     Alcohol/week: 0.0 standard drinks of alcohol    Drug use: Never        Medications:    ofloxacin (OCUFLOX) 0.3 % ophthalmic " solution  prednisoLONE acetate (PRED FORTE) 1 % ophthalmic suspension  itraconazole (SPORANOX) 100 MG capsule  multivitamin w/minerals (THERA-VIT-M) tablet  mupirocin (BACTROBAN) 2 % external ointment          Review of Systems  As mentioned above in the history present illness. All other systems were reviewed and are negative.    Physical Exam   BP: (!) 150/95  Pulse: 88  Temp: 97.5  F (36.4  C)  Resp: 18  Height: 182.9 cm (6')  Weight: 77.1 kg (170 lb)  SpO2: 100 %      Physical Exam  Vitals and nursing note reviewed.   Constitutional:       General: He is not in acute distress.     Appearance: Normal appearance. He is well-developed. He is not ill-appearing, toxic-appearing or diaphoretic.   HENT:      Head: Normocephalic and atraumatic.      Right Ear: Hearing, tympanic membrane and external ear normal. There is impacted cerumen (moderate impaction, no visible ear canal trauma).      Left Ear: External ear normal. There is impacted cerumen (with bright red blood noted in canal, visualized approximately 1 cm into canal, no canal swelling noted in visualized area).      Nose: Nose normal.   Eyes:      General: No scleral icterus.        Right eye: No discharge.         Left eye: No discharge.      Conjunctiva/sclera: Conjunctivae normal.   Cardiovascular:      Rate and Rhythm: Normal rate and regular rhythm.      Heart sounds: Normal heart sounds. No murmur heard.     No friction rub. No gallop.   Pulmonary:      Effort: Pulmonary effort is normal. No respiratory distress.      Breath sounds: Normal breath sounds. No stridor. No wheezing or rales.   Musculoskeletal:         General: No tenderness.   Skin:     General: Skin is warm.      Capillary Refill: Capillary refill takes less than 2 seconds.      Findings: No rash.   Neurological:      Mental Status: He is alert and oriented to person, place, and time.   Psychiatric:         Behavior: Behavior is cooperative.         ED Course              ED Course as of  10/20/23 1159   Fri Oct 20, 2023   1120 Phone call placed to ENT, Dr Best, and discussed patient exam.  He recommended Ciprodex 3 times daily followed immediately by hydrogen peroxide 3 times daily.  He advised that their office would reach out to the patient and schedule a follow-up within the upcoming week.     Procedures        No results found for this or any previous visit (from the past 24 hour(s)).    Medications - No data to display    Assessments & Plan (with Medical Decision Making)     I have reviewed the nursing notes.    I have reviewed the findings, diagnosis, plan and need for follow up with the patient.  27-year-old male presents the ED with left ear pain, decreased hearing following cerumen impaction removal completed yesterday at urgent care clinic.  Patient reports worsening pain but denying dizziness, vertigo, vision changes, ringing in the ears, swelling or redness from the ear.  On exam patient has bilateral cerumen impaction with left ear revealing active bleeding as well.  Incomplete visualization of bilateral ears inhibits complete exam.  Consultation completed with Dr. Best ENT and he recommends hydrogen peroxide with Ciprodex 3 times a day and then they would follow-up with patient next week.  Patient shows no signs of mastoiditis on exam.  Cannot exclude ruptured TM, otitis externa, otitis media. auricular hematoma is absent.    Discharge Medication List as of 10/20/2023 11:39 AM        START taking these medications    Details   ciprofloxacin-dexAMETHasone (CIPRODEX) 0.3-0.1 % otic suspension Place 5 drops Into the left ear 3 times daily, Disp-7.5 mL, R-0, E-Prescribe             Final diagnoses:   Bilateral impacted cerumen   Acute pain of left ear       10/20/2023   [facility] EMERGENCY DEPT       Evin, Polina Winters, APRN CNP  10/20/23 1154

## 2023-10-24 ENCOUNTER — TELEPHONE (OUTPATIENT)
Dept: RHEUMATOLOGY | Facility: CLINIC | Age: 28
End: 2023-10-24

## 2023-10-24 ENCOUNTER — OFFICE VISIT (OUTPATIENT)
Dept: OTOLARYNGOLOGY | Facility: CLINIC | Age: 28
End: 2023-10-24
Payer: COMMERCIAL

## 2023-10-24 VITALS
DIASTOLIC BLOOD PRESSURE: 84 MMHG | OXYGEN SATURATION: 100 % | HEART RATE: 89 BPM | SYSTOLIC BLOOD PRESSURE: 166 MMHG | RESPIRATION RATE: 18 BRPM

## 2023-10-24 DIAGNOSIS — H61.23 BILATERAL IMPACTED CERUMEN: ICD-10-CM

## 2023-10-24 DIAGNOSIS — H60.42 CHOLESTEATOMA OF LEFT EXTERNAL AUDITORY CANAL: Primary | ICD-10-CM

## 2023-10-24 PROCEDURE — 69210 REMOVE IMPACTED EAR WAX UNI: CPT | Mod: 50 | Performed by: OTOLARYNGOLOGY

## 2023-10-24 PROCEDURE — 99202 OFFICE O/P NEW SF 15 MIN: CPT | Mod: 25 | Performed by: OTOLARYNGOLOGY

## 2023-10-24 NOTE — TELEPHONE ENCOUNTER
Called pt and spoke to him he will come to see us today at 0345    FINESSE Gonzales RN 10/24/2023 8:51 AM

## 2023-10-24 NOTE — TELEPHONE ENCOUNTER
----- Message from Víctor Best MD sent at 10/21/2023  2:11 PM CDT -----  Patient seen in [facility] ER who had bleeding after primary car ear wash.  Please schedule end of this week or beginning of next week    DY      ----- Message -----  From: Polina Cleary APRN CNP  Sent: 10/20/2023  11:29 PM CDT  To: Víctor Best MD    This is the gentleman with ear pain.  Thanks for taking my call.  Polina Cleary WHNP, CNM, FNP, DNP

## 2023-10-24 NOTE — PROGRESS NOTES
Chief Complaint - left ear pain and drainage    History of Present Illness - Karel Webber is a 27 year old male who presents to me today for concerns of pain and bleeding left ear. It started with hearing loss due to wax. He went in for ear irrigations. The right went fine. The left hurt. They couldn't get the wax all out of the left ear. He was instructed to get Debrox. The next day he had a lot of ear pain. He went to the ER. He was given antibiotic drops. No history of ear surgery or chronic ear disease.     Past Medical History -   Patient Active Problem List   Diagnosis    Onychomycosis    Elevated blood pressure reading without diagnosis of hypertension       Physical Exam  General - The patient is in no distress.  Alert and oriented to person and place, answers questions and cooperates with examination appropriately.   Voice and Breathing - The patient was breathing comfortably without the use of accessory muscles. There was no wheezing, stridor, or stertor.  The patients voice was clear and strong.  Ears - The auricles are normal in appearance, no erythema. Both canals were impacted with cerumen. See below for the procedure. Once the cerumen was removed the tympanic membranes are normal in appearance, bony landmarks are intact.  No retraction, perforation, or masses.  No fluid or purulence was seen in the external canal or the middle ear. Left canal had some granulation inferior canal wall.       Cerumen Removal    Physical Exam and Procedure  Ears - On examination of the ears, I found that both ears were impacted with cerumen.  Therefore, I positioned the patient in the examination chair in a semi-supine position. I used the binocular surgical microscope to perform cerumen removal.  On the right side, I began by using a cerumen loop to gently lift the edges of the cerumen mass away from the walls of the external canal.  Once I did this, I was able to pull away fragments of wax and debris.  I removed all  the wax and debri. The tympanic membrane was intact, no sign of perforation or middle ear effusion.    I turned my attention to the left side once again using the binocular surgical microscope to perform cerumen removal.  I began by using a cerumen loop to gently lift the edges of the cerumen mass away from the walls of the external canal.  Once I did this, I was able to suction away fragments of wax and debris. I removed all wax and debri. He had a canal cholesteatoma with some canal bleeding. The tympanic membrane was intact, no sign of perforation or middle ear effusion.      Assessment and Plan - Karel Webber is a 27 year old male who presents to me today with bilateral cerumen impaction, and this was removed. He had left canal cholesteatoma. I suctioned all of this. I want him to continue 3 days of left ear ofloxacin and dexamethasone. We spent the remainder of today's visit on education including not putting any instrument in the ear. The patient can use over-the-counter items such as Debrox in the future or come in for cleanings.     Kirk Serna MD  Otolaryngology  [facility]

## 2023-10-24 NOTE — LETTER
10/24/2023         RE: Karel Webber  [address] [place] [address]        Dear Colleague,    Thank you for referring your patient, Karel Webber, to the [facility]. Please see a copy of my visit note below.    Chief Complaint - left ear pain and drainage    History of Present Illness - Karel Webber is a 27 year old male who presents to me today for concerns of pain and bleeding left ear. It started with hearing loss due to wax. He went in for ear irrigations. The right went fine. The left hurt. They couldn't get the wax all out of the left ear. He was instructed to get Debrox. The next day he had a lot of ear pain. He went to the ER. He was given antibiotic drops. No history of ear surgery or chronic ear disease.     Past Medical History -   Patient Active Problem List   Diagnosis     Onychomycosis     Elevated blood pressure reading without diagnosis of hypertension       Physical Exam  General - The patient is in no distress.  Alert and oriented to person and place, answers questions and cooperates with examination appropriately.   Voice and Breathing - The patient was breathing comfortably without the use of accessory muscles. There was no wheezing, stridor, or stertor.  The patients voice was clear and strong.  Ears - The auricles are normal in appearance, no erythema. Both canals were impacted with cerumen. See below for the procedure. Once the cerumen was removed the tympanic membranes are normal in appearance, bony landmarks are intact.  No retraction, perforation, or masses.  No fluid or purulence was seen in the external canal or the middle ear. Left canal had some granulation inferior canal wall.       Cerumen Removal    Physical Exam and Procedure  Ears - On examination of the ears, I found that both ears were impacted with cerumen.  Therefore, I positioned the patient in the examination chair in a semi-supine position. I used the binocular surgical microscope to  perform cerumen removal.  On the right side, I began by using a cerumen loop to gently lift the edges of the cerumen mass away from the walls of the external canal.  Once I did this, I was able to pull away fragments of wax and debris.  I removed all the wax and debri. The tympanic membrane was intact, no sign of perforation or middle ear effusion.    I turned my attention to the left side once again using the binocular surgical microscope to perform cerumen removal.  I began by using a cerumen loop to gently lift the edges of the cerumen mass away from the walls of the external canal.  Once I did this, I was able to suction away fragments of wax and debris. I removed all wax and debri. He had a canal cholesteatoma with some canal bleeding. The tympanic membrane was intact, no sign of perforation or middle ear effusion.      Assessment and Plan - Karel Webber is a 27 year old male who presents to me today with bilateral cerumen impaction, and this was removed. He had left canal cholesteatoma. I suctioned all of this. I want him to continue 3 days of left ear ofloxacin and dexamethasone. We spent the remainder of today's visit on education including not putting any instrument in the ear. The patient can use over-the-counter items such as Debrox in the future or come in for cleanings.     Kirk Serna MD  Otolaryngology  [facility]      Again, thank you for allowing me to participate in the care of your patient.        Sincerely,        Kirk Serna MD

## 2024-07-13 ENCOUNTER — HEALTH MAINTENANCE LETTER (OUTPATIENT)
Age: 29
End: 2024-07-13

## 2024-07-16 ENCOUNTER — VIRTUAL VISIT (OUTPATIENT)
Dept: FAMILY MEDICINE | Facility: CLINIC | Age: 29
End: 2024-07-16
Payer: COMMERCIAL

## 2024-07-16 DIAGNOSIS — L23.7 CONTACT DERMATITIS DUE TO POISON IVY: Primary | ICD-10-CM

## 2024-07-16 PROCEDURE — 99213 OFFICE O/P EST LOW 20 MIN: CPT | Mod: 95 | Performed by: NURSE PRACTITIONER

## 2024-07-16 PROCEDURE — G2211 COMPLEX E/M VISIT ADD ON: HCPCS | Mod: 95 | Performed by: NURSE PRACTITIONER

## 2024-07-16 RX ORDER — TRIAMCINOLONE ACETONIDE 1 MG/G
OINTMENT TOPICAL 2 TIMES DAILY
Qty: 30 G | Refills: 1 | Status: SHIPPED | OUTPATIENT
Start: 2024-07-16

## 2024-07-16 RX ORDER — PREDNISONE 10 MG/1
TABLET ORAL
Qty: 23 TABLET | Refills: 0 | Status: SHIPPED | OUTPATIENT
Start: 2024-07-16 | End: 2024-07-28

## 2024-07-16 NOTE — PROGRESS NOTES
Karel is a 28 year old who is being evaluated via a billable video visit.    How would you like to obtain your AVS? MyChart  If the video visit is dropped, the invitation should be resent by: Text to cell phone: [phone]  Will anyone else be joining your video visit? No      Assessment & Plan     Contact dermatitis due to poison ivy    - predniSONE (DELTASONE) 10 MG tablet; Take 4 tablets (40 mg) by mouth daily for 3 days, THEN 2 tablets (20 mg) daily for 3 days, THEN 1 tablet (10 mg) daily for 3 days, THEN 0.5 tablets (5 mg) daily for 3 days.  - triamcinolone (KENALOG) 0.1 % external ointment; Apply topically 2 times daily To affected areas, max 14 consecutive days in same area, not for face or groin            FUTURE APPOINTMENTS:       - Follow up in 1 week for persistent symptoms, sooner for new or worsening symptoms.     See Patient Instructions    Subjective   Karel is a 28 year old, presenting for the following health issues:  Derm Problem        7/16/2024     1:08 PM   Additional Questions   Roomed by La Nena FOWLER     Rash  Onset/Duration: 3 days   Description  Location: legs from knees down, little on arms  Character: blotchy, raised, draining, red  Itching: moderate  Intensity:  moderate  Progression of Symptoms:  worsening  Accompanying signs and symptoms:   Fever: No  Body aches or joint pain: No  Sore throat symptoms: No  Recent cold symptoms: No  History:           Previous episodes of similar rash: yes the last 3 years   New exposures:  weed whipping last weekend   Recent travel: No  Exposure to similar rash: No  Precipitating or alleviating factors: none   Therapies tried and outcome: OTC creams        Review of Systems  Constitutional, HEENT, cardiovascular, pulmonary, gi and gu systems are negative, except as otherwise noted.      Objective           Vitals:  No vitals were obtained today due to virtual visit.    Physical Exam   GENERAL: alert and no distress  EYES: Eyes grossly normal to  inspection.  No discharge or erythema, or obvious scleral/conjunctival abnormalities.  RESP: No audible wheeze, cough, or visible cyanosis.    SKIN: vesicles on erythematous base to extremities  NEURO: Cranial nerves grossly intact.  Mentation and speech appropriate for age.  PSYCH: Appropriate affect, tone, and pace of words          Video-Visit Details    Type of service:  Video Visit   Originating Location (pt. Location): Home    Distant Location (provider location):  On-site  Platform used for Video Visit: Doximity  Signed Electronically by: PHILIPPE Mehta CNP

## 2024-07-28 ENCOUNTER — MYC REFILL (OUTPATIENT)
Dept: FAMILY MEDICINE | Facility: CLINIC | Age: 29
End: 2024-07-28
Payer: COMMERCIAL

## 2024-07-28 DIAGNOSIS — L23.7 CONTACT DERMATITIS DUE TO POISON IVY: ICD-10-CM

## 2024-07-29 RX ORDER — TRIAMCINOLONE ACETONIDE 1 MG/G
OINTMENT TOPICAL 2 TIMES DAILY
Qty: 30 G | Refills: 1 | OUTPATIENT
Start: 2024-07-29

## 2025-07-19 ENCOUNTER — HEALTH MAINTENANCE LETTER (OUTPATIENT)
Age: 30
End: 2025-07-19

## 2025-08-07 ENCOUNTER — OFFICE VISIT (OUTPATIENT)
Dept: FAMILY MEDICINE | Facility: CLINIC | Age: 30
End: 2025-08-07
Payer: COMMERCIAL

## 2025-08-07 VITALS
BODY MASS INDEX: 22.62 KG/M2 | OXYGEN SATURATION: 98 % | HEIGHT: 72 IN | TEMPERATURE: 97.3 F | RESPIRATION RATE: 16 BRPM | SYSTOLIC BLOOD PRESSURE: 132 MMHG | WEIGHT: 167 LBS | DIASTOLIC BLOOD PRESSURE: 82 MMHG | HEART RATE: 60 BPM

## 2025-08-07 DIAGNOSIS — Z13.1 SCREENING FOR DIABETES MELLITUS: ICD-10-CM

## 2025-08-07 DIAGNOSIS — Z11.59 NEED FOR HEPATITIS C SCREENING TEST: ICD-10-CM

## 2025-08-07 DIAGNOSIS — Z13.6 CARDIOVASCULAR SCREENING; LDL GOAL LESS THAN 130: ICD-10-CM

## 2025-08-07 DIAGNOSIS — Z00.00 ROUTINE GENERAL MEDICAL EXAMINATION AT A HEALTH CARE FACILITY: Primary | ICD-10-CM

## 2025-08-07 DIAGNOSIS — Z11.4 SCREENING FOR HIV (HUMAN IMMUNODEFICIENCY VIRUS): ICD-10-CM

## 2025-08-07 DIAGNOSIS — R03.0 ELEVATED BLOOD PRESSURE READING WITHOUT DIAGNOSIS OF HYPERTENSION: ICD-10-CM

## 2025-08-07 LAB
ANION GAP SERPL CALCULATED.3IONS-SCNC: 11 MMOL/L (ref 7–15)
BUN SERPL-MCNC: 14.4 MG/DL (ref 6–20)
CALCIUM SERPL-MCNC: 9.6 MG/DL (ref 8.8–10.4)
CHLORIDE SERPL-SCNC: 102 MMOL/L (ref 98–107)
CHOLEST SERPL-MCNC: 131 MG/DL
CREAT SERPL-MCNC: 1.07 MG/DL (ref 0.67–1.17)
EGFRCR SERPLBLD CKD-EPI 2021: >90 ML/MIN/1.73M2
FASTING STATUS PATIENT QL REPORTED: NO
FASTING STATUS PATIENT QL REPORTED: NO
GLUCOSE SERPL-MCNC: 95 MG/DL (ref 70–99)
HCO3 SERPL-SCNC: 26 MMOL/L (ref 22–29)
HCV AB SERPL QL IA: NONREACTIVE
HDLC SERPL-MCNC: 47 MG/DL
HIV 1+2 AB+HIV1 P24 AG SERPL QL IA: NONREACTIVE
LDLC SERPL CALC-MCNC: 72 MG/DL
NONHDLC SERPL-MCNC: 84 MG/DL
POTASSIUM SERPL-SCNC: 4.4 MMOL/L (ref 3.4–5.3)
SODIUM SERPL-SCNC: 139 MMOL/L (ref 135–145)
TRIGL SERPL-MCNC: 58 MG/DL

## 2025-08-07 SDOH — HEALTH STABILITY: PHYSICAL HEALTH: ON AVERAGE, HOW MANY DAYS PER WEEK DO YOU ENGAGE IN MODERATE TO STRENUOUS EXERCISE (LIKE A BRISK WALK)?: 5 DAYS

## 2025-08-07 ASSESSMENT — SOCIAL DETERMINANTS OF HEALTH (SDOH): HOW OFTEN DO YOU GET TOGETHER WITH FRIENDS OR RELATIVES?: ONCE A WEEK
